# Patient Record
Sex: MALE | Employment: FULL TIME | RURAL
[De-identification: names, ages, dates, MRNs, and addresses within clinical notes are randomized per-mention and may not be internally consistent; named-entity substitution may affect disease eponyms.]

---

## 2021-11-05 ENCOUNTER — OFFICE VISIT (OUTPATIENT)
Dept: CARDIOLOGY | Facility: CLINIC | Age: 56
End: 2021-11-05

## 2021-11-05 VITALS
WEIGHT: 197.4 LBS | HEIGHT: 69 IN | DIASTOLIC BLOOD PRESSURE: 76 MMHG | SYSTOLIC BLOOD PRESSURE: 146 MMHG | HEART RATE: 64 BPM | BODY MASS INDEX: 29.24 KG/M2

## 2021-11-05 DIAGNOSIS — E66.3 OVERWEIGHT WITH BODY MASS INDEX (BMI) 25.0-29.9: ICD-10-CM

## 2021-11-05 DIAGNOSIS — I10 PRIMARY HYPERTENSION: ICD-10-CM

## 2021-11-05 DIAGNOSIS — I25.119 ATHEROSCLEROSIS OF NATIVE CORONARY ARTERY OF NATIVE HEART WITH ANGINA PECTORIS (HCC): Primary | ICD-10-CM

## 2021-11-05 DIAGNOSIS — F17.200 SMOKES TOBACCO DAILY: ICD-10-CM

## 2021-11-05 DIAGNOSIS — E78.5 HYPERLIPIDEMIA, UNSPECIFIED HYPERLIPIDEMIA TYPE: ICD-10-CM

## 2021-11-05 PROBLEM — R79.89 HIGH THYROID STIMULATING HORMONE (TSH) LEVEL: Status: ACTIVE | Noted: 2019-10-03

## 2021-11-05 PROBLEM — G40.909 SEIZURE DISORDER (HCC): Status: ACTIVE | Noted: 2017-08-01

## 2021-11-05 PROBLEM — I25.10 ATHEROSCLEROSIS OF CORONARY ARTERY WITHOUT ANGINA PECTORIS: Status: ACTIVE | Noted: 2021-08-10

## 2021-11-05 PROCEDURE — 99204 OFFICE O/P NEW MOD 45 MIN: CPT | Performed by: INTERNAL MEDICINE

## 2021-11-05 PROCEDURE — 93000 ELECTROCARDIOGRAM COMPLETE: CPT | Performed by: INTERNAL MEDICINE

## 2021-11-05 RX ORDER — ASPIRIN 81 MG/1
1 TABLET ORAL DAILY
COMMUNITY

## 2021-11-05 RX ORDER — LISINOPRIL 5 MG/1
1 TABLET ORAL DAILY
COMMUNITY

## 2021-11-05 RX ORDER — SILDENAFIL 50 MG/1
1 TABLET, FILM COATED ORAL AS NEEDED
COMMUNITY
End: 2021-11-05 | Stop reason: ALTCHOICE

## 2021-11-05 RX ORDER — ROSUVASTATIN CALCIUM 10 MG/1
1 TABLET, COATED ORAL DAILY
COMMUNITY

## 2021-11-05 RX ORDER — PYRITHIONE ZINC 1 G/ML
1 LOTION/SHAMPOO TOPICAL DAILY
COMMUNITY

## 2021-11-05 NOTE — PROGRESS NOTES
Subjective:     Encounter Date:11/05/2021      Patient ID: Venu Schultz is a 56 y.o. male.    Chief Complaint:  History of Present Illness    This is a 56-year-old with hypertension, hyperlipidemia, tobacco use, minimal nonobstructive coronary artery disease, who presents for evaluation of coronary artery calcification.    The patient recently underwent a lung cancer screening CT on 8/5/2021.  This showed no evidence of pulmonary nodules.  It did show evidence of central lobar emphysema.  It also showed evidence of calcification of the coronary arteries and of the aorta.  He was referred here based on those findings.    It appears that the patient had a cardiac catheterization performed at [facility] by Dr. Justino Pollack in 1/2007.  The patient reports that his presentation at that time was concerning for a heart attack so a cardiac catheterization was done.  His cardiac catheterization ended up showing a minimal atherosclerotic disease of his coronary arteries and small diffuse disease of the distal vessels.  The patient reports he has not had any other cardiac testing since then.    He works as a .  He does admit that this is quite a physical job.  He reports that he will intermittently have some chest discomfort and left arm discomfort on the job.  He is always attributed this to the activities he is performing.  He also reports some shortness of breath on occasion while working.  Neither of the symptoms have changed at all in the last few years.  He otherwise denies any palpitations, orthopnea, near-syncope or syncope, or lower extremity edema.  He denies any family history of coronary artery disease that he is aware of.  He does smoke about 2 packs/day.    Review of Systems   Constitutional: Positive for malaise/fatigue.   HENT: Negative for hearing loss, hoarse voice, nosebleeds and sore throat.    Eyes: Negative for pain.   Cardiovascular: Positive for chest pain and dyspnea  on exertion. Negative for claudication, cyanosis, irregular heartbeat, leg swelling, near-syncope, orthopnea, palpitations, paroxysmal nocturnal dyspnea and syncope.   Respiratory: Negative for shortness of breath and snoring.    Endocrine: Negative for cold intolerance, heat intolerance, polydipsia, polyphagia and polyuria.   Skin: Negative for itching and rash.   Musculoskeletal: Negative for arthritis, falls, joint pain, joint swelling, muscle cramps, muscle weakness and myalgias.   Gastrointestinal: Negative for constipation, diarrhea, dysphagia, heartburn, hematemesis, hematochezia, melena, nausea and vomiting.   Genitourinary: Negative for frequency, hematuria and hesitancy.   Neurological: Positive for light-headedness. Negative for excessive daytime sleepiness, dizziness, headaches, numbness and weakness.   Psychiatric/Behavioral: Negative for depression. The patient is not nervous/anxious.          Current Outpatient Medications:   •  aspirin (aspirin) 81 MG EC tablet, Take 1 tablet by mouth Daily., Disp: , Rfl:   •  lisinopril (PRINIVIL,ZESTRIL) 5 MG tablet, Take 1 tablet by mouth Daily., Disp: , Rfl:   •  Metamucil Fiber chewable tablet, Chew 1 each Daily., Disp: , Rfl:   •  rosuvastatin (CRESTOR) 10 MG tablet, Take 1 tablet by mouth Daily., Disp: , Rfl:     Past Medical History:   Diagnosis Date   • Cerebral aneurysm    • Coronary atherosclerosis    • Hyperlipidemia    • Hypertension    • Seizures (HCC)        Past Surgical History:   Procedure Laterality Date   • CARDIAC CATHETERIZATION  01/19/2007    Normal at [facility]   • CEREBRAL ANGIOGRAM  2013    x2 at [facility]   • FINGER SURGERY      pinky finger sewn back on       Family History   Problem Relation Age of Onset   • Cerebral aneurysm Father    • Hypertension Father    • Diabetes Father    • Cancer Sister    • Cancer Paternal Grandmother    • Cancer Cousin    • Cancer Cousin    • Cancer Cousin    • Cancer Paternal Uncle    • Cancer  "Paternal Aunt        Social History     Tobacco Use   • Smoking status: Current Every Day Smoker     Packs/day: 2.00     Years: 20.00     Pack years: 40.00   • Smokeless tobacco: Never Used   • Tobacco comment: caffeine use   Substance Use Topics   • Alcohol use: Yes     Alcohol/week: 0.0 - 1.0 standard drinks     Comment: occ   • Drug use: Never         ECG 12 Lead    Date/Time: 11/5/2021 12:39 PM  Performed by: Nori Seth MD  Authorized by: Nori Seth MD   Comparison: compared with previous ECG   Similar to previous ECG  Rhythm: sinus rhythm               Objective:     Visit Vitals  /76 (BP Location: Right arm, Patient Position: Sitting, Cuff Size: Adult)   Pulse 64   Ht 174 cm (68.5\")   Wt 89.5 kg (197 lb 6.4 oz)   BMI 29.58 kg/m²         Constitutional:       Appearance: Normal appearance. Well-developed.   Eyes:      General: Lids are normal.      Conjunctiva/sclera: Conjunctivae normal.      Pupils: Pupils are equal, round, and reactive to light.   HENT:      Head: Normocephalic and atraumatic.   Neck:      Vascular: No carotid bruit or JVD.      Lymphadenopathy: No cervical adenopathy.   Pulmonary:      Effort: Pulmonary effort is normal.      Breath sounds: Normal breath sounds.   Cardiovascular:      Normal rate. Regular rhythm.      No gallop.   Pulses:     Radial: 2+ bilaterally.  Edema:     Peripheral edema absent.   Abdominal:      Palpations: Abdomen is soft.   Musculoskeletal:      Cervical back: Full passive range of motion without pain, normal range of motion and neck supple. Skin:     General: Skin is warm and dry.   Neurological:      Mental Status: Alert and oriented to person, place, and time.               Assessment:          Diagnosis Plan   1. Atherosclerosis of native coronary artery of native heart with angina pectoris (HCC)     2. Primary hypertension     3. Hyperlipidemia, unspecified hyperlipidemia type     4. Overweight with body mass index (BMI) 25.0-29.9     5. " Smokes tobacco daily            Plan:       1.  Coronary artery calcification.  He has known nonobstructive disease noted on cardiac catheterization in 2007.  It certainly possible that he has had progression in light of his ongoing smoking and history of hyperlipidemia.  However he does not appear to be having any symptoms suggestive of angina.  His symptoms of chest discomfort and dyspnea on exertion appear to be largely stable and atypical sounding for angina.  His EKG also shows no concerning changes.  Discussed options with the patient and his wife and we opted to monitor him for now and consider ischemic work-up with a stress test in the future if he develops further symptoms.  In the meanwhile I would certainly continue with both the aspirin and statin therapy.  2.  Hypertension.  Fairly well controlled on his current regimen medications.  Continue the same.  3.  Hyperlipidemia.  He is on a low-dose of rosuvastatin.  With his known coronary artery calcification his goal LDL should be around 70 or below.  It appears that his last lipid panel showed his LDL was around 140.  If this continues to be the case I would have a low threshold to increase his rosuvastatin dosage.  4.  Tobacco use.  Patient is aware of the importance of smoking cessation.  Continue to  the patient on the importance for any assistance as needed.    I will see the patient back again in 6 months.

## 2022-09-09 ENCOUNTER — OFFICE VISIT (OUTPATIENT)
Dept: CARDIOLOGY | Facility: CLINIC | Age: 57
End: 2022-09-09

## 2022-09-09 VITALS
SYSTOLIC BLOOD PRESSURE: 134 MMHG | WEIGHT: 189 LBS | DIASTOLIC BLOOD PRESSURE: 80 MMHG | HEART RATE: 67 BPM | BODY MASS INDEX: 26.74 KG/M2

## 2022-09-09 DIAGNOSIS — G40.909 SEIZURE DISORDER: ICD-10-CM

## 2022-09-09 DIAGNOSIS — F17.200 SMOKES TOBACCO DAILY: ICD-10-CM

## 2022-09-09 DIAGNOSIS — E78.5 HYPERLIPIDEMIA, UNSPECIFIED HYPERLIPIDEMIA TYPE: ICD-10-CM

## 2022-09-09 DIAGNOSIS — R79.89 HIGH THYROID STIMULATING HORMONE (TSH) LEVEL: ICD-10-CM

## 2022-09-09 DIAGNOSIS — I10 PRIMARY HYPERTENSION: ICD-10-CM

## 2022-09-09 DIAGNOSIS — I25.119 ATHEROSCLEROSIS OF NATIVE CORONARY ARTERY OF NATIVE HEART WITH ANGINA PECTORIS: Primary | ICD-10-CM

## 2022-09-09 PROCEDURE — 93000 ELECTROCARDIOGRAM COMPLETE: CPT | Performed by: INTERNAL MEDICINE

## 2022-09-09 PROCEDURE — 99214 OFFICE O/P EST MOD 30 MIN: CPT | Performed by: INTERNAL MEDICINE

## 2022-09-09 RX ORDER — LEVOTHYROXINE SODIUM 0.03 MG/1
TABLET ORAL
COMMUNITY

## 2022-09-09 RX ORDER — SILDENAFIL 50 MG/1
TABLET, FILM COATED ORAL
COMMUNITY

## 2022-09-09 NOTE — PROGRESS NOTES
Subjective:     Encounter Date:09/09/2022      Patient ID: Venu Schultz is a 57 y.o. male.    Chief Complaint:  History of Present Illness    This is a 57-year-old with hypertension, hyperlipidemia, tobacco use, minimal nonobstructive coronary artery disease, who presents for follow up.     He presents today for follow-up.  He continues to feel well.  Denies any chest pain, shortness of breath, palpitations, orthopnea, near-syncope or syncope, or lower extremity edema.  He reports that he has been compliant with his medications.  He continues to smoke.  He has not had any issues keeping up with the physical labor required at work.    Prior History:  I saw the patient initially in 11/2021 when he presented for an evaluation of coronary artery calcifications.  The patient had  a lung cancer screening CT on 8/5/2021.  This showed no evidence of pulmonary nodules.  It did show evidence of central lobar emphysema.  It also showed evidence of calcification of the coronary arteries and of the aorta.  He was referred here based on those findings.     It appeared that the patient had a cardiac catheterization performed at [facility] by Dr. Justino Pollack in 1/2007.  The patient reports that his presentation at that time was concerning for a heart attack so a cardiac catheterization was done.  His cardiac catheterization ended up showing a minimal atherosclerotic disease of his coronary arteries and small diffuse disease of the distal vessels.  The patient reported no other cardiac testing since then.     He works as a  which is a physical job.  He denied any symptoms or any issues performing this.  He was smoking 2 packs a day.  Since he was asymptomatic I do not recommend any further cardiac work-up but recommend that he focus on risk factor modification including smoking cessation and continuing both aspirin and statin therapy.     Review of Systems   Constitutional: Negative for  malaise/fatigue.   HENT: Negative for hearing loss, hoarse voice, nosebleeds and sore throat.    Eyes: Negative for pain.   Cardiovascular: Negative for chest pain, claudication, cyanosis, dyspnea on exertion, irregular heartbeat, leg swelling, near-syncope, orthopnea, palpitations, paroxysmal nocturnal dyspnea and syncope.   Respiratory: Negative for shortness of breath and snoring.    Endocrine: Negative for cold intolerance, heat intolerance, polydipsia, polyphagia and polyuria.   Skin: Negative for itching and rash.   Musculoskeletal: Negative for arthritis, falls, joint pain, joint swelling, muscle cramps, muscle weakness and myalgias.   Gastrointestinal: Negative for constipation, diarrhea, dysphagia, heartburn, hematemesis, hematochezia, melena, nausea and vomiting.   Genitourinary: Negative for frequency, hematuria and hesitancy.   Neurological: Negative for excessive daytime sleepiness, dizziness, headaches, light-headedness, numbness and weakness.   Psychiatric/Behavioral: Negative for depression. The patient is not nervous/anxious.          Current Outpatient Medications:   •  aspirin 81 MG EC tablet, Take 1 tablet by mouth Daily., Disp: , Rfl:   •  levothyroxine (SYNTHROID, LEVOTHROID) 25 MCG tablet, levothyroxine 25 mcg tablet  Take 1 tablet every day by oral route in the morning for 90 days., Disp: , Rfl:   •  lisinopril (PRINIVIL,ZESTRIL) 5 MG tablet, Take 1 tablet by mouth Daily., Disp: , Rfl:   •  Metamucil Fiber chewable tablet, Chew 1 each Daily., Disp: , Rfl:   •  nicotine polacrilex (NICORETTE) 4 MG gum, Every 2 (Two) Hours., Disp: , Rfl:   •  rosuvastatin (CRESTOR) 10 MG tablet, Take 1 tablet by mouth Daily., Disp: , Rfl:   •  sildenafil (VIAGRA) 50 MG tablet, sildenafil 50 mg tablet  TAKE 1 TABLET BY MOUTH EVERY DAY, Disp: , Rfl:     Past Medical History:   Diagnosis Date   • Cerebral aneurysm    • Coronary atherosclerosis    • Hyperlipidemia    • Hypertension    • Seizures (HCC)        Past  Surgical History:   Procedure Laterality Date   • CARDIAC CATHETERIZATION  01/19/2007    Normal at [facility]   • CEREBRAL ANGIOGRAM  2013    x2 at [facility]   • FINGER SURGERY      pinky finger sewn back on       Family History   Problem Relation Age of Onset   • Cerebral aneurysm Father    • Hypertension Father    • Diabetes Father    • Cancer Sister    • Cancer Paternal Grandmother    • Cancer Cousin    • Cancer Cousin    • Cancer Cousin    • Cancer Paternal Uncle    • Cancer Paternal Aunt        Social History     Tobacco Use   • Smoking status: Current Every Day Smoker     Packs/day: 0.50     Years: 20.00     Pack years: 10.00   • Smokeless tobacco: Never Used   • Tobacco comment: caffeine use   Vaping Use   • Vaping Use: Never used   Substance Use Topics   • Alcohol use: Yes     Alcohol/week: 0.0 - 1.0 standard drinks     Comment: occ   • Drug use: Never         ECG 12 Lead    Date/Time: 9/9/2022 4:48 PM  Performed by: Nori Seth MD  Authorized by: Nori Seth MD   Comparison: compared with previous ECG   Similar to previous ECG  Rhythm: sinus rhythm               Objective:     Visit Vitals  /80   Pulse 67   Wt 85.7 kg (189 lb)   BMI 26.74 kg/m²         Constitutional:       Appearance: Normal appearance. Well-developed.   HENT:      Head: Normocephalic and atraumatic.   Neck:      Vascular: No carotid bruit or JVD.   Pulmonary:      Effort: Pulmonary effort is normal.      Breath sounds: Normal breath sounds.   Cardiovascular:      Normal rate. Regular rhythm.      No gallop.   Pulses:     Radial: 2+ bilaterally.  Edema:     Peripheral edema absent.   Abdominal:      Palpations: Abdomen is soft.   Skin:     General: Skin is warm and dry.   Neurological:      Mental Status: Alert and oriented to person, place, and time.             Assessment:          Diagnosis Plan   1. Atherosclerosis of native coronary artery of native heart with angina pectoris (HCC)     2. Hyperlipidemia,  unspecified hyperlipidemia type     3. Primary hypertension     4. Smokes tobacco daily     5. Seizure disorder (HCC)     6. High thyroid stimulating hormone (TSH) level            Plan:       1.  Coronary artery calcification.  Prior cardiac catheterization with minimal nonobstructive coronary artery disease.  EKG remains normal.  He is not having any symptoms concerning for ischemic heart disease or angina.  At this point I think we can continue to hold off on further work-up.  However I again stressed the importance of risk factor and medical management including with aspirin and statin therapy.  Also continue with blood pressure control with lisinopril.  We also discussed smoking cessation.  2.  Hypertension.  Well-controlled on his current regimen medications.  Continue the same.  3.  Hyperlipidemia.  On rosuvastatin.  No recent lipid panels available for my review.  4.  Seizure disorder  5.  Hypothyroidism  6.  Tobacco use.  Discussed smoking cessation with the patient.    I will see the patient back again in 1 year or sooner if further issues arise.